# Patient Record
Sex: FEMALE | Race: WHITE | NOT HISPANIC OR LATINO | Employment: OTHER | URBAN - METROPOLITAN AREA
[De-identification: names, ages, dates, MRNs, and addresses within clinical notes are randomized per-mention and may not be internally consistent; named-entity substitution may affect disease eponyms.]

---

## 2023-10-09 PROBLEM — H53.9 CHANGE IN VISION: Status: ACTIVE | Noted: 2023-10-09

## 2023-10-09 PROBLEM — E03.9 HYPOTHYROIDISM: Status: ACTIVE | Noted: 2023-10-09

## 2023-10-09 PROBLEM — D49.6 BRAIN TUMOR (MULTI): Status: ACTIVE | Noted: 2023-10-09

## 2023-10-09 PROBLEM — D58.2 ELEVATED HEMOGLOBIN (CMS-HCC): Status: ACTIVE | Noted: 2023-10-09

## 2023-10-09 PROBLEM — E53.8 VITAMIN B12 DEFICIENCY: Status: ACTIVE | Noted: 2023-10-09

## 2023-10-09 PROBLEM — I73.9 PVD (PERIPHERAL VASCULAR DISEASE) (CMS-HCC): Status: ACTIVE | Noted: 2023-10-09

## 2023-10-09 PROBLEM — Z79.899 HIGH RISK MEDICATION USE: Status: ACTIVE | Noted: 2023-10-09

## 2023-10-09 PROBLEM — H53.40 VFD (VISUAL FIELD DEFECT): Status: ACTIVE | Noted: 2023-10-09

## 2023-10-09 PROBLEM — K63.5 COLON POLYPS: Status: ACTIVE | Noted: 2023-10-09

## 2023-10-09 PROBLEM — E66.3 OVERWEIGHT WITH BODY MASS INDEX (BMI) OF 25 TO 25.9 IN ADULT: Status: ACTIVE | Noted: 2023-10-09

## 2023-10-09 PROBLEM — R73.9 HYPERGLYCEMIA: Status: ACTIVE | Noted: 2023-10-09

## 2023-10-09 PROBLEM — M81.0 OSTEOPOROSIS: Status: ACTIVE | Noted: 2023-10-09

## 2023-10-09 PROBLEM — C73 THYROID CANCER (MULTI): Status: ACTIVE | Noted: 2023-10-09

## 2023-10-09 PROBLEM — M19.90 OSTEOARTHRITIS: Status: ACTIVE | Noted: 2023-10-09

## 2023-10-09 PROBLEM — M54.16 LUMBAR RADICULOPATHY, CHRONIC: Status: ACTIVE | Noted: 2023-10-09

## 2023-10-09 PROBLEM — M84.375A STRESS FRACTURE OF LEFT FOOT: Status: ACTIVE | Noted: 2023-10-09

## 2023-10-09 PROBLEM — R06.02 SHORTNESS OF BREATH ON EXERTION: Status: ACTIVE | Noted: 2023-10-09

## 2023-10-09 PROBLEM — G47.00 INSOMNIA: Status: ACTIVE | Noted: 2023-10-09

## 2023-10-09 RX ORDER — LEVOTHYROXINE SODIUM 75 UG/1
1 TABLET ORAL DAILY
COMMUNITY

## 2023-10-09 RX ORDER — ASCORBIC ACID 500 MG
1 TABLET ORAL DAILY
COMMUNITY

## 2023-10-09 RX ORDER — ALPRAZOLAM 0.25 MG/1
0.25 TABLET ORAL NIGHTLY PRN
COMMUNITY
End: 2023-10-10 | Stop reason: SDUPTHER

## 2023-10-09 RX ORDER — CYCLOBENZAPRINE HCL 5 MG
1-2 TABLET ORAL NIGHTLY PRN
COMMUNITY
End: 2024-01-11 | Stop reason: WASHOUT

## 2023-10-09 RX ORDER — MULTIVIT-MIN/FA/LYCOPEN/LUTEIN .4-300-25
1 TABLET ORAL DAILY
COMMUNITY
End: 2024-01-11 | Stop reason: SDUPTHER

## 2023-10-09 RX ORDER — DICLOFENAC SODIUM 75 MG/1
TABLET, DELAYED RELEASE ORAL
COMMUNITY
Start: 2023-01-16 | End: 2024-01-11 | Stop reason: WASHOUT

## 2023-10-09 RX ORDER — MAGNESIUM 250 MG
1 TABLET ORAL DAILY
COMMUNITY

## 2023-10-09 RX ORDER — LACTULOSE 10 G/15ML
SOLUTION ORAL; RECTAL
COMMUNITY
Start: 2022-07-21 | End: 2024-01-11 | Stop reason: WASHOUT

## 2023-10-09 RX ORDER — LANOLIN ALCOHOL/MO/W.PET/CERES
1 CREAM (GRAM) TOPICAL DAILY
COMMUNITY

## 2023-10-09 RX ORDER — ASCORBIC ACID 1000 MG
TABLET ORAL
COMMUNITY

## 2023-10-09 RX ORDER — SODIUM FLUORIDE 5 MG/G
PASTE, DENTIFRICE DENTAL
COMMUNITY
Start: 2022-05-13 | End: 2024-04-12 | Stop reason: WASHOUT

## 2023-10-10 ENCOUNTER — OFFICE VISIT (OUTPATIENT)
Dept: PRIMARY CARE | Facility: CLINIC | Age: 88
End: 2023-10-10
Payer: MEDICARE

## 2023-10-10 VITALS
HEIGHT: 64 IN | DIASTOLIC BLOOD PRESSURE: 88 MMHG | HEART RATE: 85 BPM | BODY MASS INDEX: 24.92 KG/M2 | TEMPERATURE: 97.6 F | OXYGEN SATURATION: 97 % | SYSTOLIC BLOOD PRESSURE: 152 MMHG | WEIGHT: 146 LBS

## 2023-10-10 DIAGNOSIS — G47.00 INSOMNIA, UNSPECIFIED TYPE: ICD-10-CM
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
## 2023-10-10 DIAGNOSIS — Z78.0 ASYMPTOMATIC POSTMENOPAUSAL STATE: ICD-10-CM

## 2023-10-10 DIAGNOSIS — R52 PAIN: Primary | ICD-10-CM

## 2023-10-10 DIAGNOSIS — R29.898 DECONDITIONED LOW BACK: ICD-10-CM

## 2023-10-10 PROCEDURE — 1159F MED LIST DOCD IN RCRD: CPT | Performed by: INTERNAL MEDICINE

## 2023-10-10 PROCEDURE — 1036F TOBACCO NON-USER: CPT | Performed by: INTERNAL MEDICINE

## 2023-10-10 PROCEDURE — 1125F AMNT PAIN NOTED PAIN PRSNT: CPT | Performed by: INTERNAL MEDICINE

## 2023-10-10 PROCEDURE — 99214 OFFICE O/P EST MOD 30 MIN: CPT | Performed by: INTERNAL MEDICINE

## 2023-10-10 RX ORDER — ALPRAZOLAM 0.25 MG/1
0.25 TABLET ORAL NIGHTLY PRN
Qty: 30 TABLET | Refills: 2 | Status: SHIPPED | OUTPATIENT
Start: 2023-10-10 | End: 2024-01-11 | Stop reason: SDUPTHER

## 2023-10-10 RX ORDER — POLYETHYLENE GLYCOL 3350 17 G/17G
17 POWDER, FOR SOLUTION ORAL DAILY PRN
COMMUNITY

## 2023-10-10 ASSESSMENT — PAIN SCALES - GENERAL: PAINLEVEL: 2

## 2023-10-10 NOTE — PROGRESS NOTES
"Subjective   Patient ID: Shirlene Castro is a 87 y.o. female who presents for Hospital Follow-up (Pt here for FUV after [facility] ER on 9/16/23.  Pt needs refills on Alprazolam.).      Hospital follow up.   ER 9/16/2023 multiple falls when trying to get out of the bathtub. ER evaluation included CT chest, abd/pelvis. Had used a different bathroom. Normally does not take baths. Still has a bruise left buttock but improved.   Sciatica pain resolved  Completed PT had 2 different sessions last about 6 months ago.  Used to workout at [employer].  Does notice she is weaker. Does home exercises but not everyday.  Has trouble opening items.   Has upcoming appt ophtho f/up glaucoma. Eye drops had caused shortness of breath.  Takes bp at home every other day 120-130/70s.    HPI       PAST MEDICAL HISTORY:   1. Thyroid cancer, Dr. Mcdaniels.  Hypothyroidism, managed by Dr Mcdaniels  2. Parathyroid tumor, status post resection, 2013; surgery [facility]. now treatment Dr. Mcdaniels.  3. Benign brain tumor status post resection in 1991.  4. Cataracts. Glaucoma. laser surgery Right eye.   5. Insomnia.  6. Colon polyp. Colonoscopy in 2015.  7. Osteoporosis, DEXA 2020 T -2.6 (history of fracture)   8. Left foot stress fracture.  9. ECHO EF 60% to 65% in 8/2022  10 Lumbar radiculopath (Dr Jc)      SOCIAL HISTORY  Former smoker, remote  .    Objective   /88 (BP Location: Left arm, Patient Position: Sitting)   Pulse 85   Temp 36.4 °C (97.6 °F)   Ht 1.626 m (5' 4\")   Wt 66.2 kg (146 lb)   SpO2 97%   BMI 25.06 kg/m²     Physical Exam    Vital reviewed. Repeat 150/90 left arm. Regular cuff patient sitting.   Neck: no cervical/clavicular adenopathy  CV: RRR S1 S2 normal. No murmur  Lungs: CTA without wrr. Breath sounds symmetric no use of accessory muscles.   Abdomen: normoactive. Soft, nontender. No mass.  Extremities: +1 pretibial edema  Neuro: speech intact.     Last 6/2023 bmp, cbc  Wbc 6.5 hg 15 platelet 167 Cr " 1.2    Assessment/Plan   Diagnoses and all orders for this visit:  Pain  -     PT eval and treat; Future  Fall, sequela  -     Referral to Occupational Therapy; Future  Deconditioned low back  -     Referral to Occupational Therapy; Future  Insomnia, unspecified type  -     ALPRAZolam (Xanax) 0.25 mg tablet; Take 1 tablet (0.25 mg) by mouth as needed at bedtime for sleep.  Asymptomatic postmenopausal state  -     XR DEXA bone density; Future  Obtain dexa few weeks prior to next appointment    Reviewed OARRS report.  Considered risk of abuse, dependence, addiction, and diversion. Patient not manifesting adverse effects. No aberrant behaviors.  It is clinically appropriate to prescribe medication.  Ok to refill medication as previously ordered.    Urine tox done 4/11/2023  Contract done 7/14/223  High bp reading. Prior office reading in April within goal. Home readings within goal. Advised low sodium diet. Continue to check at home and bring list to next appointment.   F/up 3 months and prn.

## 2023-10-30 ENCOUNTER — TELEPHONE (OUTPATIENT)
Dept: CARDIOLOGY | Facility: CLINIC | Age: 88
End: 2023-10-30
Payer: MEDICARE

## 2023-10-31 ENCOUNTER — TELEPHONE (OUTPATIENT)
Dept: PRIMARY CARE | Facility: CLINIC | Age: 88
End: 2023-10-31
Payer: MEDICARE

## 2023-10-31 NOTE — TELEPHONE ENCOUNTER
----- Message from Kristin Tate MA sent at 10/24/2023 12:46 PM EDT -----      ----- Message -----  From: Dulce Marley MD  Sent: 10/23/2023   4:24 PM EDT  To: Kristin Tate MA    Bone density shows osteoporosis. She has a known history of this. She is not on a prescription medication for this. Please check if she has taken fosamax or prolia shots in the past or if she is willing to take these.

## 2023-11-01 NOTE — TELEPHONE ENCOUNTER
"Pt returned call and informed, voiced understanding.  Pt states \"No, I have not taken any medications for this and I don't want to take anything.\"  "

## 2024-01-11 ENCOUNTER — OFFICE VISIT (OUTPATIENT)
Dept: PRIMARY CARE | Facility: CLINIC | Age: 89
End: 2024-01-11
Payer: MEDICARE

## 2024-01-11 VITALS
DIASTOLIC BLOOD PRESSURE: 78 MMHG | OXYGEN SATURATION: 97 % | WEIGHT: 148 LBS | SYSTOLIC BLOOD PRESSURE: 138 MMHG | HEIGHT: 64 IN | HEART RATE: 69 BPM | BODY MASS INDEX: 25.27 KG/M2 | TEMPERATURE: 97.8 F
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
## 2024-01-11 DIAGNOSIS — C73 THYROID CANCER (MULTI): ICD-10-CM

## 2024-01-11 DIAGNOSIS — R73.9 HYPERGLYCEMIA: ICD-10-CM

## 2024-01-11 DIAGNOSIS — L65.9 HAIR LOSS: ICD-10-CM

## 2024-01-11 DIAGNOSIS — E53.8 VITAMIN B12 DEFICIENCY: ICD-10-CM

## 2024-01-11 DIAGNOSIS — E55.9 VITAMIN D DEFICIENCY: ICD-10-CM

## 2024-01-11 DIAGNOSIS — R79.9 ABNORMAL FINDING OF BLOOD CHEMISTRY, UNSPECIFIED: ICD-10-CM

## 2024-01-11 DIAGNOSIS — Z79.899 OTHER LONG TERM (CURRENT) DRUG THERAPY: ICD-10-CM

## 2024-01-11 DIAGNOSIS — I73.9 PVD (PERIPHERAL VASCULAR DISEASE) (CMS-HCC): ICD-10-CM

## 2024-01-11 DIAGNOSIS — G47.00 INSOMNIA, UNSPECIFIED TYPE: Primary | ICD-10-CM

## 2024-01-11 PROBLEM — M25.562 LEFT KNEE PAIN: Status: ACTIVE | Noted: 2023-07-05

## 2024-01-11 PROBLEM — M54.32 SCIATICA OF LEFT SIDE: Status: ACTIVE | Noted: 2023-06-06

## 2024-01-11 PROBLEM — S42.115D CLOSED NONDISPLACED FRACTURE OF BODY OF LEFT SCAPULA WITH ROUTINE HEALING: Status: ACTIVE | Noted: 2024-01-11

## 2024-01-11 PROBLEM — M17.12 PRIMARY OSTEOARTHRITIS OF LEFT KNEE: Status: ACTIVE | Noted: 2023-07-05
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
## 2024-01-11 PROBLEM — S20.229A CONTUSION OF UNSPECIFIED BACK WALL OF THORAX, INITIAL ENCOUNTER: Status: ACTIVE | Noted: 2023-09-16

## 2024-01-11 PROBLEM — M99.53 INTERVERTEBRAL DISC STENOSIS OF NEURAL CANAL OF LUMBAR REGION: Status: ACTIVE | Noted: 2023-06-06

## 2024-01-11 PROBLEM — Z86.010 HISTORY OF COLON POLYPS: Status: ACTIVE | Noted: 2024-01-11

## 2024-01-11 PROBLEM — E07.9 THYROID CONDITION: Status: ACTIVE | Noted: 2023-10-09

## 2024-01-11 PROBLEM — F41.9 ANXIETY: Status: ACTIVE | Noted: 2024-01-11

## 2024-01-11 PROBLEM — M71.20 BAKER'S CYST: Status: ACTIVE | Noted: 2024-01-11

## 2024-01-11 PROBLEM — D75.1 ERYTHROCYTOSIS: Status: ACTIVE | Noted: 2024-01-11

## 2024-01-11 PROBLEM — Z86.0100 HISTORY OF COLON POLYPS: Status: ACTIVE | Noted: 2024-01-11

## 2024-01-11 PROBLEM — H40.9 GLAUCOMA: Status: ACTIVE | Noted: 2024-01-11

## 2024-01-11 PROCEDURE — 99214 OFFICE O/P EST MOD 30 MIN: CPT | Performed by: INTERNAL MEDICINE

## 2024-01-11 PROCEDURE — 1160F RVW MEDS BY RX/DR IN RCRD: CPT | Performed by: INTERNAL MEDICINE

## 2024-01-11 PROCEDURE — 1159F MED LIST DOCD IN RCRD: CPT | Performed by: INTERNAL MEDICINE

## 2024-01-11 PROCEDURE — 1036F TOBACCO NON-USER: CPT | Performed by: INTERNAL MEDICINE

## 2024-01-11 PROCEDURE — 1126F AMNT PAIN NOTED NONE PRSNT: CPT | Performed by: INTERNAL MEDICINE

## 2024-01-11 RX ORDER — ALPRAZOLAM 0.25 MG/1
0.25 TABLET ORAL NIGHTLY PRN
Qty: 30 TABLET | Refills: 2 | Status: SHIPPED | OUTPATIENT
Start: 2024-01-11 | End: 2024-04-12 | Stop reason: SDUPTHER

## 2024-01-11 ASSESSMENT — PATIENT HEALTH QUESTIONNAIRE - PHQ9
1. LITTLE INTEREST OR PLEASURE IN DOING THINGS: NOT AT ALL
SUM OF ALL RESPONSES TO PHQ9 QUESTIONS 1 & 2: 0
2. FEELING DOWN, DEPRESSED OR HOPELESS: NOT AT ALL

## 2024-01-11 ASSESSMENT — PAIN SCALES - GENERAL: PAINLEVEL: 0-NO PAIN

## 2024-01-11 NOTE — PROGRESS NOTES
"Subjective   Patient ID: Shirlene Castro is a 88 y.o. female   Chief Complaint   Patient presents with    Follow-up     Pt here for 3 mo FUV.  Pt needs refill on Alprazolam.     Not feeling well since fall.  Losing hair  Was feeling better but pain left mid and lower back. Can occur when standing too long or trying to walk. Cannot walk very far.  Had PT. Given home exercises. Was not doing it with holidays but usually does it daily.  Uses nsaid prn. Advised to avoid.  Needs refill alprazolam. Does not report any problem with it      HPI       PAST MEDICAL HISTORY:   1. Thyroid cancer, Dr. Mcdaniels.  Hypothyroidism, managed by Dr Mcdaniels  2. Parathyroid tumor, status post resection, 2013; surgery [facility]. now treatment Dr. Mcdaniels.  3. Benign brain tumor status post resection in 1991.  4. Cataracts. Glaucoma. laser surgery Right eye.   5. Insomnia.  6. Colon polyp. Colonoscopy in 2015.  7. Osteoporosis, DEXA 2020 T -2.6 (history of fracture)   8. Left foot stress fracture.  9. ECHO EF 60% to 65% in 8/2022  10 Lumbar radiculopath (Dr Jc)      SOCIAL HISTORY  Former smoker, remote  .    Objective     Blood pressure 138/78, pulse 69, temperature 36.6 °C (97.8 °F), height 1.626 m (5' 4\"), weight 67.1 kg (148 lb), SpO2 97 %.  Body mass index is 25.4 kg/m².    Physical Exam    Vital reviewed. Repeat 150/90 left arm. Regular cuff patient sitting.   Neck: no cervical/clavicular adenopathy  CV: RRR S1 S2 normal. No murmur  Lungs: CTA without wrr. Breath sounds symmetric no use of accessory muscles.   Abdomen: normoactive. Soft, nontender. No mass.  Extremities: +trace bilateral pretibial edema  Neuro: speech intact.   Msk +tenderness left pelvic bone    Last 6/2023 bmp, cbc  Wbc 6.5 hg 15 platelet 167 Cr 1.2    Assessment/Plan     1. Insomnia, unspecified type  - DRUG SCREEN,URINE; Future  - ALPRAZolam (Xanax) 0.25 mg tablet; Take 1 tablet (0.25 mg) by mouth as needed at bedtime for sleep.  Dispense: 30 " tablet; Refill: 2    Reviewed OARRS report.  Considered risk of abuse, dependence, addiction, and diversion. Patient not manifesting adverse effects. No aberrant behaviors.  It is clinically appropriate to prescribe medication.  Ok to refill medication as previously ordered.    Urine tox done 4/11/2023  Contract done 7/14/223  2. Other long term (current) drug therapy  - DRUG SCREEN,URINE; Future    3. Hair loss  - Iron and TIBC; Future  - Ferritin; Future  TSH    4. Thyroid cancer (CMS/[facility])  Managed by Dr Mcdaniels. Will check tsh given hair loss and she indicates was not checked recently  - Thyroid Stimulating Hormone; Future    5. Hyperglycemia  - CBC; Future  - Comprehensive Metabolic Panel; Future    6. PVD (peripheral vascular disease) (CMS/[facility])    - Lipid Panel; Future    7. Vitamin D deficiency    - Vitamin D 25-Hydroxy,Total (for eval of Vitamin D levels); Future    8. Vitamin B12 deficiency    - Vitamin B12; Future    9. Abnormal finding of blood chemistry, unspecified  Due for lipid  - Lipid Panel; Future    10. Fall, sequela  - XR pelvis 3+ views; Future      Current Outpatient Medications on File Prior to Visit   Medication Sig Dispense Refill    ALPRAZolam (Xanax) 0.25 mg tablet Take 1 tablet (0.25 mg) by mouth as needed at bedtime for sleep. 30 tablet 2    ascorbic acid (Vitamin C) 500 mg tablet Take 1 tablet (500 mg) by mouth once daily.      CALCIUM CITRATE-VITAMIN D3 ORAL Take 2 tablets by mouth once daily.      cyanocobalamin (Vitamin B-12) 1,000 mcg tablet Take 1 tablet (1,000 mcg) by mouth once daily.      cyclobenzaprine (Flexeril) 5 mg tablet Take 1-2 tablets (5-10 mg) by mouth as needed at bedtime for muscle spasms.      diclofenac (Voltaren) 75 mg EC tablet       fluoride, sodium, (Sodium Fluoride 5000 Plus) 1.1 % dental cream USE AS DIRECTED.      folic acid/multivit-min/lutein (CENTRUM SILVER ORAL) Take 1 tablet by mouth once daily.      glucosamine sulfate dipot chlr (glucosamine sulfate  2KCl) 750 mg capsule take two tablets in the AM and Two tablets in the PM      lactulose 20 gram/30 mL oral solution USE AS DIRECTED      levothyroxine (Synthroid, Levoxyl) 75 mcg tablet Take 1 tablet (75 mcg) by mouth once daily.      magnesium 250 mg tablet Take 1 tablet (250 mg) by mouth once daily.      multivitamin with minerals iron-free (Centrum Silver) Take 1 tablet by mouth once daily.      polyethylene glycol (Glycolax, Miralax) 17 gram/dose powder Take 17 g by mouth once daily as needed.      psyllium husk (METAMUCIL ORAL) ORAL, Refill(s) 0, Date: 07/21/2022 11:03:00 EDT      vitamin E acetate (VITAMIN E ORAL) Vitamin E       No current facility-administered medications on file prior to visit.

## 2024-01-19 ENCOUNTER — TELEPHONE (OUTPATIENT)
Dept: PRIMARY CARE | Facility: CLINIC | Age: 89
End: 2024-01-19
Payer: MEDICARE

## 2024-01-19 NOTE — TELEPHONE ENCOUNTER
----- Message from Dulce Marley MD sent at 1/18/2024  5:26 PM EST -----  Regarding: xray results  No fracture seen on xray of pelvis. However, since she is having pain recommend seeing ortho such as Dr Patel to see if he can determine source of new pain.   ----- Message -----  From: Interface, [facility] - Imaging Results In  Sent: 1/17/2024   9:34 PM EST  To: Dulce Marley MD

## 2024-01-23 ENCOUNTER — LAB (OUTPATIENT)
Dept: LAB | Facility: LAB | Age: 89
End: 2024-01-23
Payer: MEDICARE

## 2024-01-23 DIAGNOSIS — G47.00 INSOMNIA, UNSPECIFIED TYPE: ICD-10-CM

## 2024-01-23 DIAGNOSIS — I73.9 PVD (PERIPHERAL VASCULAR DISEASE) (CMS-HCC): ICD-10-CM

## 2024-01-23 DIAGNOSIS — R79.9 ABNORMAL FINDING OF BLOOD CHEMISTRY, UNSPECIFIED: ICD-10-CM

## 2024-01-23 DIAGNOSIS — R73.9 HYPERGLYCEMIA: ICD-10-CM

## 2024-01-23 DIAGNOSIS — Z79.899 OTHER LONG TERM (CURRENT) DRUG THERAPY: ICD-10-CM

## 2024-01-23 DIAGNOSIS — L65.9 HAIR LOSS: ICD-10-CM

## 2024-01-23 DIAGNOSIS — C73 THYROID CANCER (MULTI): ICD-10-CM

## 2024-01-23 DIAGNOSIS — E53.8 VITAMIN B12 DEFICIENCY: ICD-10-CM

## 2024-01-23 LAB
ALBUMIN SERPL BCP-MCNC: 4.3 G/DL (ref 3.4–5)
ALP SERPL-CCNC: 69 U/L (ref 33–136)
ALT SERPL W P-5'-P-CCNC: 13 U/L (ref 7–45)
AMPHETAMINES UR QL SCN: ABNORMAL
ANION GAP SERPL CALC-SCNC: 15 MMOL/L (ref 10–20)
AST SERPL W P-5'-P-CCNC: 15 U/L (ref 9–39)
BARBITURATES UR QL SCN: ABNORMAL
BENZODIAZ UR QL SCN: ABNORMAL
BILIRUB SERPL-MCNC: 0.7 MG/DL (ref 0–1.2)
BUN SERPL-MCNC: 24 MG/DL (ref 6–23)
BZE UR QL SCN: ABNORMAL
CALCIUM SERPL-MCNC: 9.2 MG/DL (ref 8.6–10.6)
CANNABINOIDS UR QL SCN: ABNORMAL
CHLORIDE SERPL-SCNC: 106 MMOL/L (ref 98–107)
CHOLEST SERPL-MCNC: 186 MG/DL (ref 0–199)
CHOLESTEROL/HDL RATIO: 3.2
CO2 SERPL-SCNC: 24 MMOL/L (ref 21–32)
CREAT SERPL-MCNC: 1.07 MG/DL (ref 0.5–1.05)
EGFRCR SERPLBLD CKD-EPI 2021: 51 ML/MIN/1.73M*2
ERYTHROCYTE [DISTWIDTH] IN BLOOD BY AUTOMATED COUNT: 11.9 % (ref 11.5–14.5)
FENTANYL+NORFENTANYL UR QL SCN: ABNORMAL
FERRITIN SERPL-MCNC: 156 NG/ML (ref 8–150)
GLUCOSE SERPL-MCNC: 102 MG/DL (ref 74–99)
HCT VFR BLD AUTO: 46.8 % (ref 36–46)
HDLC SERPL-MCNC: 57.4 MG/DL
HGB BLD-MCNC: 15.3 G/DL (ref 12–16)
IRON SATN MFR SERPL: 36 % (ref 25–45)
IRON SERPL-MCNC: 118 UG/DL (ref 35–150)
LDLC SERPL CALC-MCNC: 98 MG/DL
MCH RBC QN AUTO: 31.7 PG (ref 26–34)
MCHC RBC AUTO-ENTMCNC: 32.7 G/DL (ref 32–36)
MCV RBC AUTO: 97 FL (ref 80–100)
NON HDL CHOLESTEROL: 129 MG/DL (ref 0–149)
NRBC BLD-RTO: 0 /100 WBCS (ref 0–0)
OPIATES UR QL SCN: ABNORMAL
OXYCODONE+OXYMORPHONE UR QL SCN: ABNORMAL
PCP UR QL SCN: ABNORMAL
PLATELET # BLD AUTO: 193 X10*3/UL (ref 150–450)
POTASSIUM SERPL-SCNC: 4.2 MMOL/L (ref 3.5–5.3)
PROT SERPL-MCNC: 6.3 G/DL (ref 6.4–8.2)
RBC # BLD AUTO: 4.83 X10*6/UL (ref 4–5.2)
SODIUM SERPL-SCNC: 141 MMOL/L (ref 136–145)
TIBC SERPL-MCNC: 328 UG/DL (ref 240–445)
TRIGL SERPL-MCNC: 154 MG/DL (ref 0–149)
TSH SERPL-ACNC: 1.79 MIU/L (ref 0.44–3.98)
UIBC SERPL-MCNC: 210 UG/DL (ref 110–370)
VIT B12 SERPL-MCNC: 525 PG/ML (ref 211–911)
VLDL: 31 MG/DL (ref 0–40)
WBC # BLD AUTO: 7.3 X10*3/UL (ref 4.4–11.3)

## 2024-01-23 PROCEDURE — 82607 VITAMIN B-12: CPT

## 2024-01-23 PROCEDURE — 83550 IRON BINDING TEST: CPT

## 2024-01-23 PROCEDURE — 85027 COMPLETE CBC AUTOMATED: CPT

## 2024-01-23 PROCEDURE — 80061 LIPID PANEL: CPT

## 2024-01-23 PROCEDURE — 84443 ASSAY THYROID STIM HORMONE: CPT

## 2024-01-23 PROCEDURE — 80307 DRUG TEST PRSMV CHEM ANLYZR: CPT

## 2024-01-23 PROCEDURE — 36415 COLL VENOUS BLD VENIPUNCTURE: CPT

## 2024-01-23 PROCEDURE — 83540 ASSAY OF IRON: CPT

## 2024-01-23 PROCEDURE — 80053 COMPREHEN METABOLIC PANEL: CPT

## 2024-01-23 PROCEDURE — 82728 ASSAY OF FERRITIN: CPT

## 2024-01-26 ENCOUNTER — TELEPHONE (OUTPATIENT)
Dept: PRIMARY CARE | Facility: CLINIC | Age: 89
End: 2024-01-26
Payer: MEDICARE

## 2024-01-26 NOTE — TELEPHONE ENCOUNTER
----- Message from Dulce Marley MD sent at 1/24/2024  5:45 PM EST -----  Iron stores a just a little high. If taking any iron supplements please stop. Glucose 102. Goal under 100. Protein slightly low. Recommend increase lean protein such as beans, chicken. Cholesterol and rest of labs within goal.

## 2024-04-12 ENCOUNTER — OFFICE VISIT (OUTPATIENT)
Dept: PRIMARY CARE | Facility: CLINIC | Age: 89
End: 2024-04-12
Payer: MEDICARE

## 2024-04-12 VITALS
SYSTOLIC BLOOD PRESSURE: 160 MMHG | HEIGHT: 64 IN | HEART RATE: 68 BPM | BODY MASS INDEX: 25.33 KG/M2 | OXYGEN SATURATION: 98 % | DIASTOLIC BLOOD PRESSURE: 84 MMHG | TEMPERATURE: 96.9 F | WEIGHT: 148.4 LBS

## 2024-04-12 DIAGNOSIS — G47.00 INSOMNIA, UNSPECIFIED TYPE: ICD-10-CM

## 2024-04-12 DIAGNOSIS — Z00.00 ROUTINE GENERAL MEDICAL EXAMINATION AT HEALTH CARE FACILITY: Primary | ICD-10-CM

## 2024-04-12 PROCEDURE — 1170F FXNL STATUS ASSESSED: CPT | Performed by: INTERNAL MEDICINE

## 2024-04-12 PROCEDURE — 1036F TOBACCO NON-USER: CPT | Performed by: INTERNAL MEDICINE

## 2024-04-12 PROCEDURE — G0439 PPPS, SUBSEQ VISIT: HCPCS | Performed by: INTERNAL MEDICINE

## 2024-04-12 PROCEDURE — 1160F RVW MEDS BY RX/DR IN RCRD: CPT | Performed by: INTERNAL MEDICINE

## 2024-04-12 PROCEDURE — 1159F MED LIST DOCD IN RCRD: CPT | Performed by: INTERNAL MEDICINE

## 2024-04-12 PROCEDURE — 1126F AMNT PAIN NOTED NONE PRSNT: CPT | Performed by: INTERNAL MEDICINE

## 2024-04-12 PROCEDURE — 99397 PER PM REEVAL EST PAT 65+ YR: CPT | Performed by: INTERNAL MEDICINE

## 2024-04-12 RX ORDER — MV-MIN/FA/VIT K/LUTEIN/ZEAXANT 200MCG-5MG
1 CAPSULE ORAL DAILY
COMMUNITY

## 2024-04-12 RX ORDER — MULTIVITAMIN/IRON/FOLIC ACID 18MG-0.4MG
1 TABLET ORAL DAILY
COMMUNITY

## 2024-04-12 RX ORDER — ALPRAZOLAM 0.25 MG/1
0.25 TABLET ORAL NIGHTLY PRN
Qty: 30 TABLET | Refills: 2 | Status: SHIPPED | OUTPATIENT
Start: 2024-04-12

## 2024-04-12 ASSESSMENT — ACTIVITIES OF DAILY LIVING (ADL)
DOING_HOUSEWORK: INDEPENDENT
DRESSING: INDEPENDENT
GROCERY_SHOPPING: INDEPENDENT
TAKING_MEDICATION: INDEPENDENT
MANAGING_FINANCES: INDEPENDENT
BATHING: INDEPENDENT

## 2024-04-12 ASSESSMENT — PAIN SCALES - GENERAL: PAINLEVEL: 0-NO PAIN

## 2024-04-12 ASSESSMENT — LIFESTYLE VARIABLES
SKIP TO QUESTIONS 9-10: 1
HOW OFTEN DO YOU HAVE A DRINK CONTAINING ALCOHOL: MONTHLY OR LESS
HOW OFTEN DO YOU HAVE SIX OR MORE DRINKS ON ONE OCCASION: NEVER
HOW MANY STANDARD DRINKS CONTAINING ALCOHOL DO YOU HAVE ON A TYPICAL DAY: 1 OR 2
AUDIT-C TOTAL SCORE: 1

## 2024-04-12 ASSESSMENT — PATIENT HEALTH QUESTIONNAIRE - PHQ9
SUM OF ALL RESPONSES TO PHQ9 QUESTIONS 1 AND 2: 0
2. FEELING DOWN, DEPRESSED OR HOPELESS: NOT AT ALL
1. LITTLE INTEREST OR PLEASURE IN DOING THINGS: NOT AT ALL

## 2024-04-12 ASSESSMENT — ENCOUNTER SYMPTOMS
DEPRESSION: 0
OCCASIONAL FEELINGS OF UNSTEADINESS: 0
LOSS OF SENSATION IN FEET: 0

## 2024-04-12 NOTE — PROGRESS NOTES
"Chief Complaint   Patient presents with    Medicare Annual Wellness Visit Subsequent     Pt here for AWV and 3 mo FUV.         88 y.o. for AWV and 3 month f/up.   Last visit 01/2024  Admission 3/17/-3/19/2024 [facility]-discharged to SNF for rehab.   UTI. CT with possible mass head of the pancreas.  Per discharge summary had repeat ct scan that was negative. GI was consulted.    Still feels tired. Can bath and dress ok. She is cooking for herself. States needs to be cooking better.  Eating vegetables but not everyday.   Did buy some protein bars.  Watching protein on labels. Children do bring her food. States that they bring her healthy food.   Bp 120/69 yesterday.   RAMIREZ and back pain if stands for a prolonged period. Cxr during 3/2024 admission negative.    Needs dental work.    Did eat out at [employer] yesterday. Had lunch with a friend. Rarely has pizza.     1. Thyroid cancer, Dr. Mcdaniels.  Hypothyroidism, managed by Dr Mcdaniels  2. Parathyroid tumor, status post resection, 2013; surgery [facility]. now treatment Dr. Mcdaniels.  3. Benign brain tumor status post resection in 1991.  4. Cataracts. Glaucoma. laser surgery Right eye.   5. Insomnia.  6. Colon polyp. Colonoscopy in 2015.  7. Osteoporosis, DEXA 2020 T -2.6 (history of fracture)   8. Left foot stress fracture.  9. ECHO EF 60% to 65% in 8/2022  10 Lumbar radiculopath (Dr Jc)      SOCIAL HISTORY  Former smoker, remote  .    Blood pressure (!) 158/98, pulse 68, temperature 36.1 °C (96.9 °F), height 1.626 m (5' 4\"), weight 67.3 kg (148 lb 6.4 oz), SpO2 98%.  Body mass index is 25.47 kg/m².    Physical Examination  General: NAD. Alert.   HEENT: Normocephalic atraumatic.    Eyes: no scleral icterus. Extraocular movements intact.  Pupils equal round and reactive to light.  Ears: TM intact.  No cerumen. Hearing grossly intact.   Throat: No exudate  Neck:  Supple. No thyroid goiter.  Lymph nodes: No cervical or clavicular adenopathy  Cardiovascular: " "Regular rate rhythm S1-S2 normal no murmur. No carotid bruit.   Lungs: Clear to Auscultation without wheezing, rales, or rhonchi, Breath sounds symmetric. No use of accessory muscles  Abdomen:  Normoactive bowel sounds, soft, non-tender. No mass.    Extremities: No pretibial edema  Neuro: no facial asymmetry. Strength upper and lower extremities 5/5. Sensation intact to light touch. No tremor. Babinski negative  Skin: no rash noted. moles  Vascular: DP pulses intact.   Breast: Both are symmetrical no nipple discharge.  No skin changes.  No mass palpated.  No axillary adenopathy.    Labs 01/2024 cbc, cmp, tsh,  lipid, iron, ferritin, urine tox.     No results found for: \"HGBA1C\"  Lab Results   Component Value Date    GLUCOSE 102 (H) 01/23/2024    CALCIUM 9.2 01/23/2024     01/23/2024    K 4.2 01/23/2024    CO2 24 01/23/2024     01/23/2024    BUN 24 (H) 01/23/2024    CREATININE 1.07 (H) 01/23/2024     Lab Results   Component Value Date    ALT 13 01/23/2024    AST 15 01/23/2024    ALKPHOS 69 01/23/2024    BILITOT 0.7 01/23/2024     Lab Results   Component Value Date    WBC 7.3 01/23/2024    HGB 15.3 01/23/2024    HCT 46.8 (H) 01/23/2024    MCV 97 01/23/2024     01/23/2024     Lab Results   Component Value Date    CHOL 186 01/23/2024     Lab Results   Component Value Date    HDL 57.4 01/23/2024     Lab Results   Component Value Date    LDLCALC 98 01/23/2024     Lab Results   Component Value Date    TRIG 154 (H) 01/23/2024         ASSESSMENT/PLAN  AWV  Living Will: has a living will  Cognition/Memory if 65 or above 3/3 recall.   Hepatitis C (18-79) n/a  HIV (18-64, once) n/a  Women: mammogram: did not recommend  Dexa: 10/2023 +osteoporosis but needs dental work so avoid medication at this time.   Colon cancer screening 45 and older: did not recommend.   Immunization: she recall pneumonia vaccine last year.   Depression:  denies score zero.     If Smoker/Former smoker: screen US aorta (man 65-75) " n/a  Age 50-75, 20 pack year history, quit within 15 years or currently smoking  (medicare 55-77, 30 pack year) n/a    1. Insomnia, unspecified type  Reviewed OARRS report.  Considered risk of abuse, dependence, addiction, and diversion. Patient not manifesting adverse effects. No aberrant behaviors.  It is clinically appropriate to prescribe medication.  Ok to refill medication as previously ordered.    - ALPRAZolam (Xanax) 0.25 mg tablet; Take 1 tablet (0.25 mg) by mouth as needed at bedtime for sleep.  Dispense: 30 tablet; Refill: 2    2. Routine general medical examination at health care facility          Current Outpatient Medications on File Prior to Visit   Medication Sig Dispense Refill    ALPRAZolam (Xanax) 0.25 mg tablet Take 1 tablet (0.25 mg) by mouth as needed at bedtime for sleep. 30 tablet 2    ascorbic acid (Vitamin C) 500 mg tablet Take 1 tablet (500 mg) by mouth once daily.      b complex 0.4 mg tablet Take 1 tablet by mouth once daily.      CALCIUM CITRATE-VITAMIN D3 ORAL Take 2 tablets by mouth once daily.      cyanocobalamin (Vitamin B-12) 1,000 mcg tablet Take 1 tablet (1,000 mcg) by mouth once daily.      folic acid/multivit-min/lutein (CENTRUM SILVER ORAL) Take 1 tablet by mouth once daily.      glucosamine sulfate dipot chlr (glucosamine sulfate 2KCl) 750 mg capsule take two tablets in the AM and Two tablets in the PM      levothyroxine (Synthroid, Levoxyl) 75 mcg tablet Take 1 tablet (75 mcg) by mouth once daily.      magnesium 250 mg tablet Take 1 tablet (250 mg) by mouth once daily.      mv-min-FA-vit K-lutein-zeaxant (PreserVision AREDS 2 Plus MV) 200 mcg-15 mcg- 5 mg-1 mg capsule Take 1 capsule by mouth once daily.      polyethylene glycol (Glycolax, Miralax) 17 gram/dose powder Take 17 g by mouth once daily as needed.      vitamin E acetate (VITAMIN E ORAL) 1 capsule once daily.      fluoride, sodium, (Sodium Fluoride 5000 Plus) 1.1 % dental cream USE AS DIRECTED.       No current  facility-administered medications on file prior to visit.

## 2024-05-07 ENCOUNTER — OFFICE VISIT (OUTPATIENT)
Dept: PRIMARY CARE | Facility: CLINIC | Age: 89
End: 2024-05-07
Payer: MEDICARE

## 2024-05-07 VITALS
HEIGHT: 64 IN | SYSTOLIC BLOOD PRESSURE: 150 MMHG | OXYGEN SATURATION: 97 % | HEART RATE: 70 BPM | WEIGHT: 146 LBS | DIASTOLIC BLOOD PRESSURE: 70 MMHG | BODY MASS INDEX: 24.92 KG/M2

## 2024-05-07 DIAGNOSIS — R55 SYNCOPE, UNSPECIFIED SYNCOPE TYPE: ICD-10-CM

## 2024-05-07 DIAGNOSIS — R39.9 UTI SYMPTOMS: Primary | ICD-10-CM

## 2024-05-07 DIAGNOSIS — N39.0 RECURRENT UTI: ICD-10-CM

## 2024-05-07 PROBLEM — L65.9 LOSS OF HAIR: Status: ACTIVE | Noted: 2024-05-07

## 2024-05-07 PROBLEM — R52 PAIN: Status: ACTIVE | Noted: 2024-05-07

## 2024-05-07 PROBLEM — R29.898 WEAKNESS OF BACK: Status: ACTIVE | Noted: 2024-05-07

## 2024-05-07 PROBLEM — R11.2 NAUSEA WITH VOMITING, UNSPECIFIED: Status: ACTIVE | Noted: 2024-03-19

## 2024-05-07 PROBLEM — M62.81 MUSCLE WEAKNESS (GENERALIZED): Status: ACTIVE | Noted: 2024-03-19

## 2024-05-07 PROBLEM — K57.90 DIVERTICULOSIS OF INTESTINE, PART UNSPECIFIED, WITHOUT PERFORATION OR ABSCESS WITHOUT BLEEDING: Status: ACTIVE | Noted: 2024-03-19

## 2024-05-07 PROBLEM — R26.2 DIFFICULTY IN WALKING, NOT ELSEWHERE CLASSIFIED: Status: ACTIVE | Noted: 2024-03-19

## 2024-05-07 PROBLEM — D72.829 ELEVATED WHITE BLOOD CELL COUNT, UNSPECIFIED: Status: ACTIVE | Noted: 2024-03-19

## 2024-05-07 PROBLEM — M79.606 PAIN OF LOWER EXTREMITY: Status: ACTIVE | Noted: 2024-05-07

## 2024-05-07 PROBLEM — E55.9 VITAMIN D DEFICIENCY: Status: ACTIVE | Noted: 2024-05-07

## 2024-05-07 PROBLEM — R79.9 ABNORMAL BLOOD CHEMISTRY LEVEL: Status: ACTIVE | Noted: 2024-05-07

## 2024-05-07 PROBLEM — R53.83 FATIGUE: Status: ACTIVE | Noted: 2024-05-07

## 2024-05-07 LAB
POC APPEARANCE, URINE: CLEAR
POC BILIRUBIN, URINE: NEGATIVE
POC BLOOD, URINE: NEGATIVE
POC COLOR, URINE: ABNORMAL
POC GLUCOSE, URINE: NEGATIVE MG/DL
POC KETONES, URINE: NEGATIVE MG/DL
POC LEUKOCYTES, URINE: ABNORMAL
POC NITRITE,URINE: NEGATIVE
POC PH, URINE: 5 PH
POC PROTEIN, URINE: NEGATIVE MG/DL
POC SPECIFIC GRAVITY, URINE: 1.02
POC UROBILINOGEN, URINE: 0.2 EU/DL

## 2024-05-07 PROCEDURE — 87086 URINE CULTURE/COLONY COUNT: CPT

## 2024-05-07 PROCEDURE — 1159F MED LIST DOCD IN RCRD: CPT | Performed by: INTERNAL MEDICINE

## 2024-05-07 PROCEDURE — 99213 OFFICE O/P EST LOW 20 MIN: CPT | Performed by: INTERNAL MEDICINE

## 2024-05-07 PROCEDURE — 81002 URINALYSIS NONAUTO W/O SCOPE: CPT | Performed by: INTERNAL MEDICINE

## 2024-05-07 PROCEDURE — 1036F TOBACCO NON-USER: CPT | Performed by: INTERNAL MEDICINE

## 2024-05-07 PROCEDURE — 1160F RVW MEDS BY RX/DR IN RCRD: CPT | Performed by: INTERNAL MEDICINE

## 2024-05-07 RX ORDER — CEPHALEXIN 500 MG/1
500 CAPSULE ORAL 2 TIMES DAILY
Qty: 14 CAPSULE | Refills: 0 | Status: SHIPPED | OUTPATIENT
Start: 2024-05-07 | End: 2024-05-14

## 2024-05-07 ASSESSMENT — ENCOUNTER SYMPTOMS
OCCASIONAL FEELINGS OF UNSTEADINESS: 0
DEPRESSION: 0
LOSS OF SENSATION IN FEET: 0

## 2024-05-07 NOTE — PROGRESS NOTES
Patient is seen in office today with 3 days history of frequency of micturition and burning during micturition.  She has also noticed the urine  is cloudy.  She was admitted to [facility] in March with syncopal or near syncopal episode.  At that time she was found to have a UTI also.  She received IV antibiotic initially and was discharged to the nursing home on [facility].  She has no fever or chill.  Denies any nausea matting pain abdomen.  Review of other systems are negative.    On examination:  General examination: No acute discomfort  Eyes: There is no pallor or jaundice  Lungs: Clear to auscultation  CVS: Heart sounds are regular there is no gallop murmur  Abdomen: Normal bowel sounds skin is no tenderness  Genitalia system there is no CVA tenderness    Urine dip test: Is positive for leukocyte esterase    Assessment and plan:  1.  UTI symptoms: Urine dip test in office is indicative of UTI: I am going to prescribe her on cephalexin.  2.  History of recurrent UTI: Urine culture is being sent for further evaluation and sensitivity  3.  History of syncopal episode.  She did not have any further episode since discharge from the hospital.  She is advised to follow-up with  in a couple of weeks.  I will contact her as soon as the result of urine culture is available.

## 2024-05-09 LAB — BACTERIA UR CULT: NORMAL

## 2024-05-10 ENCOUNTER — TELEPHONE (OUTPATIENT)
Dept: PRIMARY CARE | Facility: CLINIC | Age: 89
End: 2024-05-10
Payer: MEDICARE

## 2024-05-10 NOTE — TELEPHONE ENCOUNTER
Left a message for pt.     ----- Message from Alyssa Reese MD sent at 5/10/2024  9:09 AM EDT -----  Urine culture is negative.  She can stop antibiotic.  If symptoms are persistent follow-up with your regular primary care physician as soon as possible  ----- Message -----  From: Dodie Montgomery MA  Sent: 5/7/2024   4:16 PM EDT  To: Alyssa Reese MD

## 2024-08-13 DIAGNOSIS — G47.00 INSOMNIA, UNSPECIFIED TYPE: ICD-10-CM

## 2024-08-13 RX ORDER — ALPRAZOLAM 0.25 MG/1
TABLET ORAL
Qty: 14 TABLET | Refills: 0 | Status: SHIPPED | OUTPATIENT
Start: 2024-08-13

## 2024-08-27 ENCOUNTER — APPOINTMENT (OUTPATIENT)
Dept: PRIMARY CARE | Facility: CLINIC | Age: 89
End: 2024-08-27
Payer: MEDICARE

## 2024-08-27 VITALS
SYSTOLIC BLOOD PRESSURE: 140 MMHG | HEART RATE: 65 BPM | WEIGHT: 144.4 LBS | HEIGHT: 64 IN | OXYGEN SATURATION: 96 % | BODY MASS INDEX: 24.65 KG/M2 | TEMPERATURE: 97.7 F | DIASTOLIC BLOOD PRESSURE: 82 MMHG

## 2024-08-27 DIAGNOSIS — R06.09 DOE (DYSPNEA ON EXERTION): ICD-10-CM

## 2024-08-27 DIAGNOSIS — R29.890 LOSS OF HEIGHT: ICD-10-CM

## 2024-08-27 DIAGNOSIS — G47.00 INSOMNIA, UNSPECIFIED TYPE: Primary | ICD-10-CM

## 2024-08-27 DIAGNOSIS — Z13.220 SCREENING, LIPID: ICD-10-CM

## 2024-08-27 DIAGNOSIS — E03.9 HYPOTHYROIDISM, UNSPECIFIED TYPE: ICD-10-CM

## 2024-08-27 DIAGNOSIS — E78.1 HIGH TRIGLYCERIDES: ICD-10-CM

## 2024-08-27 DIAGNOSIS — E55.9 VITAMIN D DEFICIENCY: ICD-10-CM

## 2024-08-27 DIAGNOSIS — Z91.81 HISTORY OF FALL: ICD-10-CM

## 2024-08-27 DIAGNOSIS — R53.83 OTHER FATIGUE: ICD-10-CM

## 2024-08-27 DIAGNOSIS — C73 THYROID CANCER (MULTI): ICD-10-CM

## 2024-08-27 PROCEDURE — 1123F ACP DISCUSS/DSCN MKR DOCD: CPT | Performed by: INTERNAL MEDICINE

## 2024-08-27 PROCEDURE — 1126F AMNT PAIN NOTED NONE PRSNT: CPT | Performed by: INTERNAL MEDICINE

## 2024-08-27 PROCEDURE — 1036F TOBACCO NON-USER: CPT | Performed by: INTERNAL MEDICINE

## 2024-08-27 PROCEDURE — 1159F MED LIST DOCD IN RCRD: CPT | Performed by: INTERNAL MEDICINE

## 2024-08-27 PROCEDURE — 1158F ADVNC CARE PLAN TLK DOCD: CPT | Performed by: INTERNAL MEDICINE

## 2024-08-27 PROCEDURE — 99214 OFFICE O/P EST MOD 30 MIN: CPT | Performed by: INTERNAL MEDICINE

## 2024-08-27 RX ORDER — ALPRAZOLAM 0.25 MG/1
TABLET ORAL
Qty: 30 TABLET | Refills: 2 | Status: SHIPPED | OUTPATIENT
Start: 2024-08-27

## 2024-08-27 ASSESSMENT — ENCOUNTER SYMPTOMS
DEPRESSION: 0
OCCASIONAL FEELINGS OF UNSTEADINESS: 0
LOSS OF SENSATION IN FEET: 0

## 2024-08-27 ASSESSMENT — PAIN SCALES - GENERAL: PAINLEVEL: 0-NO PAIN

## 2024-08-27 NOTE — PROGRESS NOTES
"Chief Complaint   Patient presents with    Follow-up    Establish Care     Pt here for 4 mo FUV and refills       88 y.o. for  f/up. Tired and nauseated in the morning.   Last visit 04/2024  Fall-March  Had x-rays ok.  Since then off alignment. Notices it when she dresses.  Loss of height     Prior sciatica down left leg. Pain clinic. Inj. Resolved.  Does not recall name of doctor.     RAMIREZ-intermittent. No wheezing. No chest pain or pressure    Eye drop-developed allergies. Had hallucinations. Stops. Had laser-did well.    Needs refill xanax. No s.e.     1. Thyroid cancer, Dr. Mcdaniels.  Hypothyroidism, managed by Dr Mcdaniels  2. Parathyroid tumor, status post resection, 2013; surgery [facility]. now treatment Dr. Mcdaniels.  3. Benign brain tumor status post resection in 1991.  4. Cataracts. Glaucoma. laser surgery Right eye.   5. Insomnia.  6. Colon polyp. Colonoscopy in 2015.  7. Osteoporosis, DEXA 2020 T -2.6 (history of fracture)   8. Left foot stress fracture.  9. ECHO EF 60% to 65% in 8/2022  10 Lumbar radiculopath (Dr Jc)    11. UTI adm 3/2024.     SOCIAL HISTORY  Former smoker, remote  .    Blood pressure 140/82, pulse 65, temperature 36.5 °C (97.7 °F), height 1.626 m (5' 4\"), weight 65.5 kg (144 lb 6.4 oz), SpO2 96%.  Body mass index is 24.79 kg/m².  Weight change:  -2lb  Able to go up and down the exam table  Vital reviewed  Neck: no cervical/clavicular adenopathy  CV: RRR S1 S2 normal. No murmur. No carotid bruit.   Lungs: CTA without wrr. Breath sounds symmetric  Abdomen: normoactive. Soft, nontender. No mass.  Extremities: no pretibial edema  Neuro: speech intact.         Labs 01/2024 cbc, cmp, tsh,  lipid, iron, ferritin, urine tox.     No results found for: \"HGBA1C\"  Lab Results   Component Value Date    GLUCOSE 102 (H) 01/23/2024    CALCIUM 9.2 01/23/2024     01/23/2024    K 4.2 01/23/2024    CO2 24 01/23/2024     01/23/2024    BUN 24 (H) 01/23/2024    CREATININE 1.07 (H) " 01/23/2024     Lab Results   Component Value Date    ALT 13 01/23/2024    AST 15 01/23/2024    ALKPHOS 69 01/23/2024    BILITOT 0.7 01/23/2024     Lab Results   Component Value Date    WBC 7.3 01/23/2024    HGB 15.3 01/23/2024    HCT 46.8 (H) 01/23/2024    MCV 97 01/23/2024     01/23/2024     Lab Results   Component Value Date    CHOL 186 01/23/2024     Lab Results   Component Value Date    HDL 57.4 01/23/2024     Lab Results   Component Value Date    LDLCALC 98 01/23/2024     Lab Results   Component Value Date    TRIG 154 (H) 01/23/2024         ASSESSMENT/PLAN  1. Insomnia, unspecified type  Reviewed OARRS report.  Considered risk of abuse, dependence, addiction, and diversion. Patient not manifesting adverse effects. No aberrant behaviors.  It is clinically appropriate to prescribe medication.  Ok to refill medication as previously ordered.    - ALPRAZolam (Xanax) 0.25 mg tablet; 1 po at night  Dispense: 30 tablet; Refill: 2    2. Loss of height    - XR lumbar spine complete 4+ views; Future  - XR thoracic spine 3 views; Future    3. History of fall  - XR lumbar spine complete 4+ views; Future  - XR thoracic spine 3 views; Future  - Referral to Orthopaedic Surgery; Future    4. RAMIREZ (dyspnea on exertion)  - XR chest 2 views; Future  - CBC; Future  - Complete Pulmonary Function Test (Spirometry/DLCO/Lung Volumes); Future  - Stress Test; Future    5. Thyroid cancer (Multi)    - Thyroid Stimulating Hormone; Future    6. Hypothyroidism, unspecified type  - Thyroid Stimulating Hormone; Future    7. Other fatigue    - CBC; Future  - Vitamin B12; Future    8. Screening, lipid    9. High triglycerides    - Comprehensive Metabolic Panel; Future  - Lipid Panel; Future    10. Vitamin D deficiency  - Vitamin D 25-Hydroxy,Total (for eval of Vitamin D levels); Future      Women: mammogram: did not recommend  Dexa: 10/2023 +osteoporosis but needs dental work so avoid medication at this time.   Colon cancer screening 45 and  older: did not recommend            Current Outpatient Medications on File Prior to Visit   Medication Sig Dispense Refill    ALPRAZolam (Xanax) 0.25 mg tablet TAKE 1 TABLET(0.25 MG) BY MOUTH AT BEDTIME AS NEEDED FOR SLEEP 14 tablet 0    ascorbic acid (Vitamin C) 500 mg tablet Take 1 tablet (500 mg) by mouth once daily.      b complex 0.4 mg tablet Take 1 tablet by mouth once daily.      CALCIUM CITRATE-VITAMIN D3 ORAL Take 2 tablets by mouth once daily.      cyanocobalamin (Vitamin B-12) 1,000 mcg tablet Take 1 tablet (1,000 mcg) by mouth once daily.      folic acid/multivit-min/lutein (CENTRUM SILVER ORAL) Take 1 tablet by mouth once daily.      glucosamine sulfate dipot chlr (glucosamine sulfate 2KCl) 750 mg capsule take two tablets in the AM and Two tablets in the PM      levothyroxine (Synthroid, Levoxyl) 75 mcg tablet Take 1 tablet (75 mcg) by mouth once daily.      magnesium 250 mg tablet Take 1 tablet (250 mg) by mouth once daily.      mv-min-FA-vit K-lutein-zeaxant (PreserVision AREDS 2 Plus MV) 200 mcg-15 mcg- 5 mg-1 mg capsule Take 1 capsule by mouth once daily.      polyethylene glycol (Glycolax, Miralax) 17 gram/dose powder Take 17 g by mouth once daily as needed.      vitamin E acetate (VITAMIN E ORAL) 1 capsule once daily.       No current facility-administered medications on file prior to visit.

## 2024-08-30 ENCOUNTER — TELEPHONE (OUTPATIENT)
Dept: PRIMARY CARE | Facility: CLINIC | Age: 89
End: 2024-08-30
Payer: MEDICARE

## 2024-08-30 DIAGNOSIS — R06.09 DOE (DYSPNEA ON EXERTION): Primary | ICD-10-CM

## 2024-08-30 RX ORDER — AMINOPHYLLINE 25 MG/ML
125 INJECTION, SOLUTION INTRAVENOUS ONCE AS NEEDED
OUTPATIENT
Start: 2024-08-30

## 2024-08-30 RX ORDER — REGADENOSON 0.08 MG/ML
0.4 INJECTION, SOLUTION INTRAVENOUS
OUTPATIENT
Start: 2024-08-30

## 2024-08-30 NOTE — TELEPHONE ENCOUNTER
"Pt left message \"I wanted to update Dr. Aviles about the stress test she wants me to have.  I have it scheduled for 9/13/24 at [facility], but after talking about this with my family they don't think that I can handle this with the treadmill with my breathing.  I wanted to call you first before I cancel it.\"    "

## 2024-09-13 ENCOUNTER — HOSPITAL ENCOUNTER (OUTPATIENT)
Dept: RADIOLOGY | Facility: HOSPITAL | Age: 89
Discharge: HOME | End: 2024-09-13
Payer: MEDICARE

## 2024-09-13 ENCOUNTER — LAB (OUTPATIENT)
Dept: LAB | Facility: LAB | Age: 89
End: 2024-09-13
Payer: MEDICARE

## 2024-09-13 ENCOUNTER — HOSPITAL ENCOUNTER (OUTPATIENT)
Dept: CARDIOLOGY | Facility: HOSPITAL | Age: 89
Discharge: HOME | End: 2024-09-13
Payer: MEDICARE

## 2024-09-13 ENCOUNTER — APPOINTMENT (OUTPATIENT)
Dept: CARDIOLOGY | Facility: HOSPITAL | Age: 89
End: 2024-09-13
Payer: MEDICARE

## 2024-09-13 DIAGNOSIS — E03.9 HYPOTHYROIDISM, UNSPECIFIED TYPE: ICD-10-CM

## 2024-09-13 DIAGNOSIS — R53.83 OTHER FATIGUE: ICD-10-CM

## 2024-09-13 DIAGNOSIS — R06.09 DOE (DYSPNEA ON EXERTION): ICD-10-CM

## 2024-09-13 DIAGNOSIS — E78.1 HIGH TRIGLYCERIDES: ICD-10-CM

## 2024-09-13 DIAGNOSIS — E55.9 VITAMIN D DEFICIENCY: ICD-10-CM

## 2024-09-13 DIAGNOSIS — C73 THYROID CANCER (MULTI): ICD-10-CM

## 2024-09-13 LAB
25(OH)D3 SERPL-MCNC: 46 NG/ML (ref 30–100)
ALBUMIN SERPL BCP-MCNC: 4.4 G/DL (ref 3.4–5)
ALP SERPL-CCNC: 77 U/L (ref 33–136)
ALT SERPL W P-5'-P-CCNC: 12 U/L (ref 7–45)
ANION GAP SERPL CALC-SCNC: 12 MMOL/L (ref 10–20)
AST SERPL W P-5'-P-CCNC: 13 U/L (ref 9–39)
BILIRUB SERPL-MCNC: 1 MG/DL (ref 0–1.2)
BUN SERPL-MCNC: 22 MG/DL (ref 6–23)
CALCIUM SERPL-MCNC: 9.6 MG/DL (ref 8.6–10.3)
CHLORIDE SERPL-SCNC: 106 MMOL/L (ref 98–107)
CHOLEST SERPL-MCNC: 189 MG/DL (ref 0–199)
CHOLESTEROL/HDL RATIO: 2.9
CO2 SERPL-SCNC: 28 MMOL/L (ref 21–32)
CREAT SERPL-MCNC: 1.15 MG/DL (ref 0.5–1.05)
EGFRCR SERPLBLD CKD-EPI 2021: 46 ML/MIN/1.73M*2
ERYTHROCYTE [DISTWIDTH] IN BLOOD BY AUTOMATED COUNT: 13 % (ref 11.5–14.5)
GLUCOSE SERPL-MCNC: 116 MG/DL (ref 74–99)
HCT VFR BLD AUTO: 47.1 % (ref 36–46)
HDLC SERPL-MCNC: 65.5 MG/DL
HGB BLD-MCNC: 15.4 G/DL (ref 12–16)
LDLC SERPL CALC-MCNC: 101 MG/DL
MCH RBC QN AUTO: 30.5 PG (ref 26–34)
MCHC RBC AUTO-ENTMCNC: 32.7 G/DL (ref 32–36)
MCV RBC AUTO: 93 FL (ref 80–100)
NON HDL CHOLESTEROL: 124 MG/DL (ref 0–149)
NRBC BLD-RTO: 0 /100 WBCS (ref 0–0)
PLATELET # BLD AUTO: 204 X10*3/UL (ref 150–450)
POTASSIUM SERPL-SCNC: 4.1 MMOL/L (ref 3.5–5.3)
PROT SERPL-MCNC: 6.9 G/DL (ref 6.4–8.2)
RBC # BLD AUTO: 5.05 X10*6/UL (ref 4–5.2)
SODIUM SERPL-SCNC: 142 MMOL/L (ref 136–145)
TRIGL SERPL-MCNC: 111 MG/DL (ref 0–149)
TSH SERPL-ACNC: 1.78 MIU/L (ref 0.44–3.98)
VIT B12 SERPL-MCNC: 492 PG/ML (ref 211–911)
VLDL: 22 MG/DL (ref 0–40)
WBC # BLD AUTO: 8.7 X10*3/UL (ref 4.4–11.3)

## 2024-09-13 PROCEDURE — 93017 CV STRESS TEST TRACING ONLY: CPT

## 2024-09-13 PROCEDURE — 36415 COLL VENOUS BLD VENIPUNCTURE: CPT

## 2024-09-13 PROCEDURE — 93016 CV STRESS TEST SUPVJ ONLY: CPT | Performed by: STUDENT IN AN ORGANIZED HEALTH CARE EDUCATION/TRAINING PROGRAM

## 2024-09-13 PROCEDURE — 82306 VITAMIN D 25 HYDROXY: CPT

## 2024-09-13 PROCEDURE — 93018 CV STRESS TEST I&R ONLY: CPT | Performed by: STUDENT IN AN ORGANIZED HEALTH CARE EDUCATION/TRAINING PROGRAM

## 2024-09-13 PROCEDURE — 80053 COMPREHEN METABOLIC PANEL: CPT

## 2024-09-13 PROCEDURE — 3430000001 HC RX 343 DIAGNOSTIC RADIOPHARMACEUTICALS: Performed by: INTERNAL MEDICINE

## 2024-09-13 PROCEDURE — A9502 TC99M TETROFOSMIN: HCPCS | Performed by: INTERNAL MEDICINE

## 2024-09-13 PROCEDURE — 84443 ASSAY THYROID STIM HORMONE: CPT

## 2024-09-13 PROCEDURE — 2500000004 HC RX 250 GENERAL PHARMACY W/ HCPCS (ALT 636 FOR OP/ED): Performed by: INTERNAL MEDICINE

## 2024-09-13 PROCEDURE — 80061 LIPID PANEL: CPT

## 2024-09-13 PROCEDURE — 82607 VITAMIN B-12: CPT

## 2024-09-13 PROCEDURE — 78452 HT MUSCLE IMAGE SPECT MULT: CPT

## 2024-09-13 PROCEDURE — 85027 COMPLETE CBC AUTOMATED: CPT

## 2024-09-13 RX ORDER — REGADENOSON 0.08 MG/ML
0.4 INJECTION, SOLUTION INTRAVENOUS
Status: COMPLETED | OUTPATIENT
Start: 2024-09-13 | End: 2024-09-13

## 2024-09-17 ENCOUNTER — TELEPHONE (OUTPATIENT)
Dept: PRIMARY CARE | Facility: CLINIC | Age: 89
End: 2024-09-17
Payer: MEDICARE

## 2024-09-17 NOTE — TELEPHONE ENCOUNTER
----- Message from Dulce Marley sent at 9/16/2024  6:54 PM EDT -----  Vitamin D, b12, liver tests are within goal. Glucose 116. Goal 100 or less. Thyroid level within goal. Please find out what pharmacy to send in rx.  Kidney function abnormal but stable from prior test.  Please make sure drinking enough water.

## 2024-09-17 NOTE — TELEPHONE ENCOUNTER
----- Message from Dulce Marley sent at 9/13/2024  4:59 PM EDT -----  Stress test does not show ischemia (does not show decreased blood flow to the heart).  This is a good test to check for heart disease but does not completely rule out heart disease.

## 2024-09-17 NOTE — TELEPHONE ENCOUNTER
Pt returned call and informed of blood results and stress test results, voiced understanding.  Pt states she does not need a refill on her thyroid medication at this time.

## 2024-09-30 ENCOUNTER — TELEPHONE (OUTPATIENT)
Dept: PRIMARY CARE | Facility: CLINIC | Age: 89
End: 2024-09-30
Payer: MEDICARE

## 2024-09-30 NOTE — TELEPHONE ENCOUNTER
----- Message from Dulce Marley sent at 9/27/2024  4:37 PM EDT -----  Xrays show possible compression fractures-please set up appt so can review with pt. Can be in person or virtual.

## 2024-10-09 ENCOUNTER — APPOINTMENT (OUTPATIENT)
Dept: PRIMARY CARE | Facility: CLINIC | Age: 89
End: 2024-10-09
Payer: MEDICARE

## 2024-10-09 VITALS
SYSTOLIC BLOOD PRESSURE: 158 MMHG | HEART RATE: 67 BPM | BODY MASS INDEX: 25.2 KG/M2 | WEIGHT: 147.6 LBS | DIASTOLIC BLOOD PRESSURE: 70 MMHG | TEMPERATURE: 97.9 F | OXYGEN SATURATION: 98 % | HEIGHT: 64 IN

## 2024-10-09 DIAGNOSIS — M81.0 OSTEOPOROSIS, UNSPECIFIED OSTEOPOROSIS TYPE, UNSPECIFIED PATHOLOGICAL FRACTURE PRESENCE: ICD-10-CM

## 2024-10-09 DIAGNOSIS — R71.8 ELEVATED HEMATOCRIT: ICD-10-CM

## 2024-10-09 DIAGNOSIS — J42 CHRONIC BRONCHITIS, UNSPECIFIED CHRONIC BRONCHITIS TYPE (MULTI): Primary | ICD-10-CM

## 2024-10-09 PROCEDURE — 1036F TOBACCO NON-USER: CPT | Performed by: INTERNAL MEDICINE

## 2024-10-09 PROCEDURE — 1158F ADVNC CARE PLAN TLK DOCD: CPT | Performed by: INTERNAL MEDICINE

## 2024-10-09 PROCEDURE — 99214 OFFICE O/P EST MOD 30 MIN: CPT | Performed by: INTERNAL MEDICINE

## 2024-10-09 PROCEDURE — 1126F AMNT PAIN NOTED NONE PRSNT: CPT | Performed by: INTERNAL MEDICINE

## 2024-10-09 PROCEDURE — 1159F MED LIST DOCD IN RCRD: CPT | Performed by: INTERNAL MEDICINE

## 2024-10-09 PROCEDURE — 1123F ACP DISCUSS/DSCN MKR DOCD: CPT | Performed by: INTERNAL MEDICINE

## 2024-10-09 RX ORDER — ALBUTEROL SULFATE 90 UG/1
2 INHALANT RESPIRATORY (INHALATION) EVERY 4 HOURS PRN
Qty: 8 G | Refills: 5 | Status: SHIPPED | OUTPATIENT
Start: 2024-10-09 | End: 2025-10-09

## 2024-10-09 ASSESSMENT — PATIENT HEALTH QUESTIONNAIRE - PHQ9
2. FEELING DOWN, DEPRESSED OR HOPELESS: NOT AT ALL
1. LITTLE INTEREST OR PLEASURE IN DOING THINGS: NOT AT ALL
SUM OF ALL RESPONSES TO PHQ9 QUESTIONS 1 & 2: 0

## 2024-10-09 ASSESSMENT — PAIN SCALES - GENERAL: PAINLEVEL: 0-NO PAIN

## 2024-11-20 ENCOUNTER — APPOINTMENT (OUTPATIENT)
Dept: PRIMARY CARE | Facility: CLINIC | Age: 89
End: 2024-11-20
Payer: MEDICARE

## 2024-11-20 VITALS
HEART RATE: 67 BPM | TEMPERATURE: 97.2 F | OXYGEN SATURATION: 96 % | WEIGHT: 146.6 LBS | DIASTOLIC BLOOD PRESSURE: 80 MMHG | SYSTOLIC BLOOD PRESSURE: 152 MMHG | HEIGHT: 64 IN | BODY MASS INDEX: 25.03 KG/M2

## 2024-11-20 DIAGNOSIS — R73.9 HYPERGLYCEMIA: ICD-10-CM

## 2024-11-20 DIAGNOSIS — I10 HYPERTENSION, UNSPECIFIED TYPE: Primary | ICD-10-CM

## 2024-11-20 PROBLEM — D58.2 ELEVATED HEMOGLOBIN (CMS-HCC): Status: RESOLVED | Noted: 2023-10-09 | Resolved: 2024-11-20

## 2024-11-20 PROCEDURE — 1126F AMNT PAIN NOTED NONE PRSNT: CPT | Performed by: INTERNAL MEDICINE

## 2024-11-20 PROCEDURE — 1159F MED LIST DOCD IN RCRD: CPT | Performed by: INTERNAL MEDICINE

## 2024-11-20 PROCEDURE — 3077F SYST BP >= 140 MM HG: CPT | Performed by: INTERNAL MEDICINE

## 2024-11-20 PROCEDURE — 1123F ACP DISCUSS/DSCN MKR DOCD: CPT | Performed by: INTERNAL MEDICINE

## 2024-11-20 PROCEDURE — 3078F DIAST BP <80 MM HG: CPT | Performed by: INTERNAL MEDICINE

## 2024-11-20 PROCEDURE — 99213 OFFICE O/P EST LOW 20 MIN: CPT | Performed by: INTERNAL MEDICINE

## 2024-11-20 PROCEDURE — 1036F TOBACCO NON-USER: CPT | Performed by: INTERNAL MEDICINE

## 2024-11-20 PROCEDURE — 1160F RVW MEDS BY RX/DR IN RCRD: CPT | Performed by: INTERNAL MEDICINE

## 2024-11-20 ASSESSMENT — PATIENT HEALTH QUESTIONNAIRE - PHQ9
SUM OF ALL RESPONSES TO PHQ9 QUESTIONS 1 & 2: 0
2. FEELING DOWN, DEPRESSED OR HOPELESS: NOT AT ALL
1. LITTLE INTEREST OR PLEASURE IN DOING THINGS: NOT AT ALL

## 2024-11-20 ASSESSMENT — PAIN SCALES - GENERAL: PAINLEVEL_OUTOF10: 0-NO PAIN

## 2024-11-20 NOTE — PROGRESS NOTES
"Chief Complaint   Patient presents with    Follow-up     Pt here for 1 mo FUV       88 y.o.   Last visit 10/2024    RAMIREZ-intermittent. No wheezing. No chest pain or pressure  Eye drop-developed allergies. Had hallucinations. Stops. Had laser-did well.  RAMIREZ. Ok when at rest. No history of asthma.  Had PFTs, CXR, and stress test. Pfts small airway disease.   Loss of height. Denies back pain. History of osteoporosis-does not want any treatment.      Since last visit.   133/70 at endocrinologist last week.  Home readings 130s. Sometimes low 140s.  She indicates she has the albuterol inhaler but has not used it yet.  She was working in the yard and pulling flowers and was good to go in the house to use it.  However she started talking to her neighbor and her symptoms past and she did not need it.  She does not have any chest pain or pressure.  She is planning to spend the holidays with her family.    1. Thyroid cancer, Dr. Mcdaniels.  Hypothyroidism, managed by Dr Mcdaniels  2. Parathyroid tumor, status post resection, 2013; surgery [facility]. now treatment Dr. Mcdaniels.  3. Benign brain tumor status post resection in 1991.  4. Cataracts. Glaucoma. laser surgery Right eye.   5. Insomnia.  6. Colon polyp. Colonoscopy in 2015.  7. Osteoporosis, DEXA 2020 T -2.6 (history of fracture)   8. Left foot stress fracture.  9. ECHO EF 60% to 65% in 8/2022  10 Lumbar radiculopath (Dr Jc)    11. UTI adm 3/2024.     SOCIAL HISTORY  Former smoker, remote  .    Blood pressure 150/78, pulse 67, temperature 36.2 °C (97.2 °F), height 1.626 m (5' 4\"), weight 66.5 kg (146 lb 9.6 oz), SpO2 96%.  Body mass index is 25.16 kg/m².  BP repeated left arm sitting.  Neuro: speech intact.  Speech intact. No tremor.   Weight  stable.        No results found for: \"HGBA1C\"  Lab Results   Component Value Date    GLUCOSE 116 (H) 09/13/2024    CALCIUM 9.6 09/13/2024     09/13/2024    K 4.1 09/13/2024    CO2 28 09/13/2024     09/13/2024 "    BUN 22 09/13/2024    CREATININE 1.15 (H) 09/13/2024     Lab Results   Component Value Date    ALT 12 09/13/2024    AST 13 09/13/2024    ALKPHOS 77 09/13/2024    BILITOT 1.0 09/13/2024     Lab Results   Component Value Date    WBC 8.7 09/13/2024    HGB 15.4 09/13/2024    HCT 47.1 (H) 09/13/2024    MCV 93 09/13/2024     09/13/2024     Lab Results   Component Value Date    CHOL 189 09/13/2024    CHOL 186 01/23/2024     Lab Results   Component Value Date    HDL 65.5 09/13/2024    HDL 57.4 01/23/2024     Lab Results   Component Value Date    LDLCALC 101 (H) 09/13/2024    LDLCALC 98 01/23/2024     Lab Results   Component Value Date    TRIG 111 09/13/2024    TRIG 154 (H) 01/23/2024     Labs 01/2024 cbc, cmp, tsh,  lipid, iron, ferritin, urine tox.      ASSESSMENT/PLAN  1. Hypertension, unspecified type (Primary)  She declines medication. Opts to continue to watch blood pressure at home.  She is asymptomatic.  She would like to wait until the spring for follow-up.  Plan follow-up in March or April.  Labs prior to next appointment  - Comprehensive Metabolic Panel; Future  - CBC; Future    2. Hyperglycemia  - Comprehensive Metabolic Panel; Future  - CBC; Future  - Hemoglobin A1C; Future    Had flu vaccine        Current Outpatient Medications on File Prior to Visit   Medication Sig Dispense Refill    albuterol (Ventolin HFA) 90 mcg/actuation inhaler Inhale 2 puffs every 4 hours if needed for wheezing or shortness of breath. 8 g 5    ALPRAZolam (Xanax) 0.25 mg tablet 1 po at night 30 tablet 2    ascorbic acid (Vitamin C) 500 mg tablet Take 1 tablet (500 mg) by mouth once daily.      b complex 0.4 mg tablet Take 1 tablet by mouth once daily.      CALCIUM CITRATE-VITAMIN D3 ORAL Take 2 tablets by mouth once daily.      cyanocobalamin (Vitamin B-12) 1,000 mcg tablet Take 1 tablet (1,000 mcg) by mouth once daily.      folic acid/multivit-min/lutein (CENTRUM SILVER ORAL) Take 1 tablet by mouth once daily.      glucosamine  sulfate dipot chlr (glucosamine sulfate 2KCl) 750 mg capsule take two tablets in the AM and Two tablets in the PM      levothyroxine (Synthroid, Levoxyl) 75 mcg tablet Take 1 tablet (75 mcg) by mouth once daily.      magnesium 250 mg tablet Take 1 tablet (250 mg) by mouth once daily.      mv-min-FA-vit K-lutein-zeaxant (PreserVision AREDS 2 Plus MV) 200 mcg-15 mcg- 5 mg-1 mg capsule Take 1 capsule by mouth once daily.      polyethylene glycol (Glycolax, Miralax) 17 gram/dose powder Mix 17 g of powder and drink once daily as needed.      vitamin E acetate (VITAMIN E ORAL) 1 capsule once daily.       No current facility-administered medications on file prior to visit.

## 2024-11-27 DIAGNOSIS — G47.00 INSOMNIA, UNSPECIFIED TYPE: ICD-10-CM

## 2024-11-27 RX ORDER — ALPRAZOLAM 0.25 MG/1
TABLET ORAL
Qty: 30 TABLET | Refills: 2 | Status: SHIPPED | OUTPATIENT
Start: 2024-11-27

## 2024-11-27 NOTE — TELEPHONE ENCOUNTER
Patient needs appointment within 90 days because of medication. Next appointment  can be virtual. Refills need to be done at appointments and not in between appointments.

## 2025-03-05 ENCOUNTER — TELEPHONE (OUTPATIENT)
Dept: PRIMARY CARE | Facility: CLINIC | Age: 89
End: 2025-03-05
Payer: MEDICARE

## 2025-03-05 DIAGNOSIS — G47.00 INSOMNIA, UNSPECIFIED TYPE: ICD-10-CM

## 2025-03-05 RX ORDER — ALPRAZOLAM 0.25 MG/1
TABLET ORAL
Qty: 30 TABLET | Refills: 0 | Status: SHIPPED | OUTPATIENT
Start: 2025-03-05 | End: 2025-03-06 | Stop reason: SDUPTHER

## 2025-03-05 NOTE — TELEPHONE ENCOUNTER
----- Message from Dulce Marley sent at 3/5/2025 12:45 PM EST -----  Rx sent for 30 days. This is a controlled substance and needs to be seen every 90 days for this medication. Please have patient move up appointment in April so seen before runs out of 30 day supply

## 2025-03-06 ENCOUNTER — OFFICE VISIT (OUTPATIENT)
Dept: PRIMARY CARE | Facility: CLINIC | Age: 89
End: 2025-03-06
Payer: MEDICARE

## 2025-03-06 VITALS
HEIGHT: 64 IN | HEART RATE: 68 BPM | WEIGHT: 148.38 LBS | OXYGEN SATURATION: 97 % | DIASTOLIC BLOOD PRESSURE: 82 MMHG | SYSTOLIC BLOOD PRESSURE: 138 MMHG | BODY MASS INDEX: 25.33 KG/M2 | TEMPERATURE: 97.3 F

## 2025-03-06 DIAGNOSIS — Z13.220 SCREENING, LIPID: ICD-10-CM

## 2025-03-06 DIAGNOSIS — G47.00 INSOMNIA, UNSPECIFIED TYPE: ICD-10-CM

## 2025-03-06 DIAGNOSIS — R73.9 HYPERGLYCEMIA: ICD-10-CM

## 2025-03-06 DIAGNOSIS — I49.49 PREMATURE BEATS: Primary | ICD-10-CM

## 2025-03-06 DIAGNOSIS — E55.9 VITAMIN D DEFICIENCY: ICD-10-CM

## 2025-03-06 DIAGNOSIS — I49.1 PAC (PREMATURE ATRIAL CONTRACTION): ICD-10-CM

## 2025-03-06 PROCEDURE — 93000 ELECTROCARDIOGRAM COMPLETE: CPT | Performed by: INTERNAL MEDICINE

## 2025-03-06 PROCEDURE — 99214 OFFICE O/P EST MOD 30 MIN: CPT | Performed by: INTERNAL MEDICINE

## 2025-03-06 PROCEDURE — 1123F ACP DISCUSS/DSCN MKR DOCD: CPT | Performed by: INTERNAL MEDICINE

## 2025-03-06 PROCEDURE — 1159F MED LIST DOCD IN RCRD: CPT | Performed by: INTERNAL MEDICINE

## 2025-03-06 PROCEDURE — 1036F TOBACCO NON-USER: CPT | Performed by: INTERNAL MEDICINE

## 2025-03-06 RX ORDER — ALPRAZOLAM 0.25 MG/1
TABLET ORAL
Qty: 15 TABLET | Refills: 0 | Status: SHIPPED | OUTPATIENT
Start: 2025-03-06

## 2025-03-06 ASSESSMENT — PATIENT HEALTH QUESTIONNAIRE - PHQ9
2. FEELING DOWN, DEPRESSED OR HOPELESS: NOT AT ALL
SUM OF ALL RESPONSES TO PHQ9 QUESTIONS 1 & 2: 0
1. LITTLE INTEREST OR PLEASURE IN DOING THINGS: NOT AT ALL

## 2025-03-06 NOTE — PROGRESS NOTES
"Chief Complaint   Patient presents with    Follow-up     Pt here for 4 mo FUV and refills       89 y.o.   Last visit 11/2024    Eye drop-developed allergies. Had hallucinations. Stops. Had laser-did well.  RAMIREZ. Intermittent. Ok when at rest. No history of asthma.  Had PFTs, CXR, and stress test. Pfts small airway disease.   Loss of height. Denies back pain. History of osteoporosis-does not want any treatment.    BP at last visit 152/80    Since last visit home readings typically sbp 130s. Rare 150.   Notes xanax no longer seems to help with sleep. Has been taking at night. Melatonin works better. Would like to stop xanax.   Sees assistant at Dr Mcdaniels's office.   Glucosamine helps with joints-she has taken for years    Past Medical History    1. Thyroid cancer, Dr. Mcdaniels.  Hypothyroidism, managed by Dr Mcdaniels  2. Parathyroid tumor, status post resection, 2013; surgery [facility]. now treatment Dr. cMdaniels.  3. Benign brain tumor status post resection in 1991.  4. Cataracts. Glaucoma. laser surgery Right eye.   5. Insomnia.  6. Colon polyp. Colonoscopy in 2015.  7. Osteoporosis, DEXA 2020 T -2.6 (history of fracture)   8. Left foot stress fracture.  9. ECHO EF 60% to 65% in 8/2022  10 Lumbar radiculopath (Dr Jc)    11. UTI adm 3/2024.     SOCIAL HISTORY  Former smoker, remote  .    Blood pressure 138/82, pulse 68, temperature 36.3 °C (97.3 °F), height 1.626 m (5' 4\"), weight 67.3 kg (148 lb 6.1 oz), SpO2 97%.  Body mass index is 25.47 kg/m².    Neuro: speech intact.  Speech intact. No tremor.   Weight  stable.     Vital reviewed  Neck: no cervical/clavicular adenopathy  CV: at times premature beats.  No carotid bruit.   Lungs: CTA without wrr. Breath sounds symmetric  Abdomen: normoactive. Soft, nontender. No mass.  Extremities: no pretibial edema  Neuro: speech intact.  No facial asymmetry. No tremor noted    Labs 09/2024 lipid, b12, cbc, cmp, TSH, D    No results found for: \"HGBA1C\"  Lab Results "   Component Value Date    GLUCOSE 116 (H) 09/13/2024    CALCIUM 9.6 09/13/2024     09/13/2024    K 4.1 09/13/2024    CO2 28 09/13/2024     09/13/2024    BUN 22 09/13/2024    CREATININE 1.15 (H) 09/13/2024     Lab Results   Component Value Date    ALT 12 09/13/2024    AST 13 09/13/2024    ALKPHOS 77 09/13/2024    BILITOT 1.0 09/13/2024     Lab Results   Component Value Date    WBC 8.7 09/13/2024    HGB 15.4 09/13/2024    HCT 47.1 (H) 09/13/2024    MCV 93 09/13/2024     09/13/2024     Lab Results   Component Value Date    CHOL 189 09/13/2024    CHOL 186 01/23/2024     Lab Results   Component Value Date    HDL 65.5 09/13/2024    HDL 57.4 01/23/2024     Lab Results   Component Value Date    LDLCALC 101 (H) 09/13/2024    LDLCALC 98 01/23/2024     Lab Results   Component Value Date    TRIG 111 09/13/2024    TRIG 154 (H) 01/23/2024     Labs 01/2024 cbc, cmp, tsh,  lipid, iron, ferritin, urine tox.      ASSESSMENT/PLAN    1. Insomnia, unspecified type  Will wean off-she indicates too small to cut in half. Will take every other night then stop. -changed rx (sent in new rx for less pills)   - ALPRAZolam (Xanax) 0.25 mg tablet; 1 po every other night for 30 days then stop  Dispense: 15 tablet; Refill: 0    2. Premature beats (Primary)  - ECG 12 Lead  EKG sinus +premature supraventricular beats. Labs as previously ordered (electrolytes, blood count)  Ordered Mg  She declines monitor, echo, or seeing cardiologist. States she does not want any testing of her heart and if it is her time to go she does not want any type of evaluation or treatment.     3. PAC (premature atrial contraction)  - Magnesium; Future  - Magnesium  - Comprehensive Metabolic Panel; Future  - CBC; Future    4. Vitamin D deficiency  - Vitamin D 25-Hydroxy,Total (for eval of Vitamin D levels); Future    5. Hyperglycemia  - Comprehensive Metabolic Panel; Future  - CBC; Future    6. Screening, lipid  - Lipid Panel; Future    Labs now and in 6  months.     Current Outpatient Medications on File Prior to Visit   Medication Sig Dispense Refill    albuterol (Ventolin HFA) 90 mcg/actuation inhaler Inhale 2 puffs every 4 hours if needed for wheezing or shortness of breath. 8 g 5    ALPRAZolam (Xanax) 0.25 mg tablet TAKE 1 TABLET BY MOUTH AT NIGHT 30 tablet 0    ascorbic acid (Vitamin C) 500 mg tablet Take 1 tablet (500 mg) by mouth once daily.      b complex 0.4 mg tablet Take 1 tablet by mouth once daily.      CALCIUM CITRATE-VITAMIN D3 ORAL Take 2 tablets by mouth once daily.      cyanocobalamin (Vitamin B-12) 1,000 mcg tablet Take 1 tablet (1,000 mcg) by mouth once daily.      glucosamine sulfate dipot chlr (glucosamine sulfate 2KCl) 750 mg capsule take two tablets in the AM and Two tablets in the PM      levothyroxine (Synthroid, Levoxyl) 75 mcg tablet Take 1 tablet (75 mcg) by mouth once daily.      magnesium 250 mg tablet Take 1 tablet (250 mg) by mouth once daily.      mv-min-FA-vit K-lutein-zeaxant (PreserVision AREDS 2 Plus MV) 200 mcg-15 mcg- 5 mg-1 mg capsule Take 1 capsule by mouth 2 times a day.      polyethylene glycol (Glycolax, Miralax) 17 gram/dose powder Mix 17 g of powder and drink once daily as needed.      vitamin E acetate (VITAMIN E ORAL) 1 capsule once daily.      [DISCONTINUED] ALPRAZolam (Xanax) 0.25 mg tablet TAKE 1 TABLET BY MOUTH AT NIGHT 30 tablet 2    [DISCONTINUED] folic acid/multivit-min/lutein (CENTRUM SILVER ORAL) Take 1 tablet by mouth once daily. (Patient not taking: Reported on 3/6/2025)       No current facility-administered medications on file prior to visit.

## 2025-04-03 ENCOUNTER — TELEPHONE (OUTPATIENT)
Dept: PRIMARY CARE | Facility: CLINIC | Age: 89
End: 2025-04-03
Payer: MEDICARE

## 2025-04-03 NOTE — TELEPHONE ENCOUNTER
"Pt left message \"I wanted to talk about my blood pressure fluctuating over the past week, mostly on the high side.\"  Attempted to call pt back, no answer and no option to leave message.  "

## 2025-04-04 LAB
NON-UH HIE A/G RATIO: 1.1
NON-UH HIE ALB: 3.7 G/DL (ref 3.4–5)
NON-UH HIE ALK PHOS: 78 UNIT/L (ref 45–117)
NON-UH HIE BILIRUBIN, TOTAL: 1 MG/DL (ref 0.3–1.2)
NON-UH HIE BUN/CREAT RATIO: 17.5
NON-UH HIE BUN: 21 MG/DL (ref 9–23)
NON-UH HIE CALCIUM: 9.6 MG/DL (ref 8.7–10.4)
NON-UH HIE CALCULATED OSMOLALITY: 288 MOSM/KG (ref 275–295)
NON-UH HIE CHLORIDE: 106 MMOL/L (ref 98–107)
NON-UH HIE CO2, VENOUS: 30 MMOL/L (ref 20–31)
NON-UH HIE CREATININE: 1.2 MG/DL (ref 0.5–0.8)
NON-UH HIE GFR AA: 51
NON-UH HIE GLOBULIN: 3.5 G/DL
NON-UH HIE GLOMERULAR FILTRATION RATE: 42 ML/MIN/1.73M?
NON-UH HIE GLUCOSE: 99 MG/DL (ref 74–106)
NON-UH HIE GOT: 18 UNIT/L (ref 15–37)
NON-UH HIE GPT: 12 UNIT/L (ref 10–49)
NON-UH HIE HCT: 45.7 % (ref 36–46)
NON-UH HIE HGB A1C: 5.4 %
NON-UH HIE HGB: 15.3 G/DL (ref 12–16)
NON-UH HIE INSTR WBC ND: 6.4
NON-UH HIE K: 4.1 MMOL/L (ref 3.5–5.1)
NON-UH HIE MAGNESIUM: 2.1 MG/DL (ref 1.6–2.6)
NON-UH HIE MCH: 30.9 PG (ref 27–34)
NON-UH HIE MCHC: 33.4 G/DL (ref 32–37)
NON-UH HIE MCV: 92.4 FL (ref 80–100)
NON-UH HIE MPV: 8.8 FL (ref 7.4–10.4)
NON-UH HIE NA: 143 MMOL/L (ref 135–145)
NON-UH HIE PLATELET: 180 X10 (ref 150–450)
NON-UH HIE RBC: 4.94 X10 (ref 4.2–5.4)
NON-UH HIE RDW: 13.2 % (ref 11.5–14.5)
NON-UH HIE TOTAL PROTEIN: 7.2 G/DL (ref 5.7–8.2)
NON-UH HIE WBC: 6.4 X10 (ref 4.5–11)

## 2025-04-07 ENCOUNTER — TELEPHONE (OUTPATIENT)
Dept: PRIMARY CARE | Facility: CLINIC | Age: 89
End: 2025-04-07
Payer: MEDICARE

## 2025-04-07 NOTE — TELEPHONE ENCOUNTER
----- Message from Dulce Marley sent at 4/4/2025  5:13 PM EDT -----  Kidney function is abnormal but stable compared to prior imaging. Glucose 99. Goal under 100. Test to check average sugar over the last 3 months is normal. Rest of labs within goal.

## 2025-04-11 ENCOUNTER — TELEPHONE (OUTPATIENT)
Dept: PRIMARY CARE | Facility: CLINIC | Age: 89
End: 2025-04-11
Payer: MEDICARE

## 2025-04-11 NOTE — TELEPHONE ENCOUNTER
"Pt left message \"I feel like I need an antibiotic, I had an old one here and I took one tablet but I think I will need more.\"  Attempted to contact pt, no answer and no option to leave message.  "

## 2025-04-15 ENCOUNTER — APPOINTMENT (OUTPATIENT)
Dept: PRIMARY CARE | Facility: CLINIC | Age: 89
End: 2025-04-15
Payer: MEDICARE

## 2025-06-19 ENCOUNTER — OFFICE VISIT (OUTPATIENT)
Dept: PRIMARY CARE | Facility: CLINIC | Age: 89
End: 2025-06-19
Payer: MEDICARE

## 2025-06-19 VITALS
BODY MASS INDEX: 24.41 KG/M2 | SYSTOLIC BLOOD PRESSURE: 112 MMHG | DIASTOLIC BLOOD PRESSURE: 64 MMHG | HEART RATE: 64 BPM | WEIGHT: 143 LBS | HEIGHT: 64 IN

## 2025-06-19 DIAGNOSIS — G47.00 INSOMNIA, UNSPECIFIED TYPE: ICD-10-CM

## 2025-06-19 DIAGNOSIS — R03.0 ELEVATED BLOOD PRESSURE READING: Primary | ICD-10-CM

## 2025-06-19 PROCEDURE — 1036F TOBACCO NON-USER: CPT | Performed by: FAMILY MEDICINE

## 2025-06-19 PROCEDURE — 1159F MED LIST DOCD IN RCRD: CPT | Performed by: FAMILY MEDICINE

## 2025-06-19 PROCEDURE — 99214 OFFICE O/P EST MOD 30 MIN: CPT | Performed by: FAMILY MEDICINE

## 2025-06-19 ASSESSMENT — ENCOUNTER SYMPTOMS
DIZZINESS: 0
SHORTNESS OF BREATH: 0
FEVER: 0
HEADACHES: 0
FATIGUE: 0
ACTIVITY CHANGE: 0

## 2025-06-19 NOTE — ASSESSMENT & PLAN NOTE
Stable   - discussed medication options, patient does not wish to have prescription medication at this time   - discussed good sleep hygiene   - f/u PRN

## 2025-06-19 NOTE — PROGRESS NOTES
"Subjective   Patient ID: Shirlene Castro is a 89 y.o. female who presents for Sick Visit (Bp issues).    Elevated blood pressure readings   - reports she has been having erratic blood pressure readings at home   - has been having intermittently high numbers   - no personal history of elevated blood pressure, but has a strong family history   - reports she has been using an automatic cuff and getting readings in the 130-150 range   - denies any significant headaches or chest pain   - does have some dyspnea issues, but this has been ongoing for a long time   - does get out of breath when walking for long distances   - does not oversalt food, but does not closely monitor the salt intake at all     Insomnia   - reports she has been struggling with insomnia the last few months   - was previously taking xanax in the night, which was helping her go back to sleep in the night   - she took herself off the medication, and has been struggling with sleep since   - has been taking tylenol PM in its place which she does not find as helpful   - has been feeling fatigued during the day   - does not take naps during the day because she struggles to  fall asleep during the day            Review of Systems   Constitutional:  Negative for activity change, fatigue and fever.   Respiratory:  Negative for shortness of breath.    Cardiovascular:  Negative for chest pain.   Neurological:  Negative for dizziness and headaches.       Objective   /64   Pulse 64   Ht 1.626 m (5' 4\")   Wt 64.9 kg (143 lb)   BMI 24.55 kg/m²     Physical Exam  Constitutional:       Appearance: Normal appearance.   Cardiovascular:      Rate and Rhythm: Normal rate and regular rhythm.   Neurological:      Mental Status: She is alert.   Psychiatric:         Mood and Affect: Mood normal.         Behavior: Behavior normal.         Assessment/Plan   Problem List Items Addressed This Visit           ICD-10-CM    Insomnia G47.00    Stable   - discussed " medication options, patient does not wish to have prescription medication at this time   - discussed good sleep hygiene   - f/u PRN           Other Visit Diagnoses         Codes      Elevated blood pressure reading    -  Primary R03.0    stable   - bp stable in the office   - continue current ambulatory monitoring   - instructions on appropriate technique for taking the medication

## 2025-08-18 ENCOUNTER — TELEPHONE (OUTPATIENT)
Dept: PRIMARY CARE | Facility: CLINIC | Age: 89
End: 2025-08-18
Payer: MEDICARE

## 2025-08-22 LAB
NON-UH HIE A/G RATIO: 1.3
NON-UH HIE ALB: 3.8 G/DL (ref 3.4–5)
NON-UH HIE ALK PHOS: 79 UNIT/L (ref 45–117)
NON-UH HIE BILIRUBIN, TOTAL: 1 MG/DL (ref 0.3–1.2)
NON-UH HIE BUN/CREAT RATIO: 13.3
NON-UH HIE BUN: 16 MG/DL (ref 9–23)
NON-UH HIE CALCIUM: 9.3 MG/DL (ref 8.7–10.4)
NON-UH HIE CALCULATED LDL CHOLESTEROL: 94 MG/DL (ref 60–130)
NON-UH HIE CALCULATED OSMOLALITY: 285 MOSM/KG (ref 275–295)
NON-UH HIE CHLORIDE: 107 MMOL/L (ref 98–107)
NON-UH HIE CHOLESTEROL: 173 MG/DL (ref 100–200)
NON-UH HIE CO2, VENOUS: 29 MMOL/L (ref 20–31)
NON-UH HIE CREATININE: 1.2 MG/DL (ref 0.5–0.8)
NON-UH HIE GFR AA: 51
NON-UH HIE GLOBULIN: 2.9 G/DL
NON-UH HIE GLOMERULAR FILTRATION RATE: 42 ML/MIN/1.73M?
NON-UH HIE GLUCOSE: 102 MG/DL (ref 74–106)
NON-UH HIE GOT: 15 UNIT/L (ref 15–37)
NON-UH HIE GPT: 10 UNIT/L (ref 10–49)
NON-UH HIE HCT: 44.9 % (ref 36–46)
NON-UH HIE HDL CHOLESTEROL: 57 MG/DL (ref 40–60)
NON-UH HIE HGB: 15.1 G/DL (ref 12–16)
NON-UH HIE INSTR WBC ND: 6.2
NON-UH HIE K: 4 MMOL/L (ref 3.5–5.1)
NON-UH HIE MCH: 31.5 PG (ref 27–34)
NON-UH HIE MCHC: 33.8 G/DL (ref 32–37)
NON-UH HIE MCV: 93.2 FL (ref 80–100)
NON-UH HIE MPV: 8.8 FL (ref 7.4–10.4)
NON-UH HIE NA: 142 MMOL/L (ref 135–145)
NON-UH HIE PLATELET: 187 X10 (ref 150–450)
NON-UH HIE RBC: 4.81 X10 (ref 4.2–5.4)
NON-UH HIE RDW: 13.3 % (ref 11.5–14.5)
NON-UH HIE TOTAL CHOL/HDL CHOL RATIO: 3
NON-UH HIE TOTAL PROTEIN: 6.7 G/DL (ref 5.7–8.2)
NON-UH HIE TRIGLYCERIDES: 108 MG/DL (ref 30–150)
NON-UH HIE VIT D 25: 41 NG/ML
NON-UH HIE WBC: 6.2 X10 (ref 4.5–11)

## 2025-09-04 ENCOUNTER — APPOINTMENT (OUTPATIENT)
Dept: PRIMARY CARE | Facility: CLINIC | Age: 89
End: 2025-09-04
Payer: MEDICARE

## 2025-09-04 PROBLEM — N39.0 URINARY TRACT INFECTION, SITE NOT SPECIFIED: Status: RESOLVED | Noted: 2024-03-19 | Resolved: 2025-09-04

## 2025-09-25 ENCOUNTER — APPOINTMENT (OUTPATIENT)
Dept: PRIMARY CARE | Facility: CLINIC | Age: 89
End: 2025-09-25
Payer: MEDICARE